# Patient Record
Sex: MALE | Race: WHITE | NOT HISPANIC OR LATINO | Employment: OTHER | URBAN - METROPOLITAN AREA
[De-identification: names, ages, dates, MRNs, and addresses within clinical notes are randomized per-mention and may not be internally consistent; named-entity substitution may affect disease eponyms.]

---

## 2022-08-16 DIAGNOSIS — K92.2 GASTROINTESTINAL HEMORRHAGE, UNSPECIFIED GASTROINTESTINAL HEMORRHAGE TYPE: ICD-10-CM

## 2022-08-16 DIAGNOSIS — D50.0 IRON DEFICIENCY ANEMIA SECONDARY TO BLOOD LOSS (CHRONIC): Primary | ICD-10-CM

## 2022-08-17 DIAGNOSIS — Z86.010 HISTORY OF COLON POLYPS: Primary | ICD-10-CM

## 2022-08-17 DIAGNOSIS — K21.9 GASTROESOPHAGEAL REFLUX DISEASE, UNSPECIFIED WHETHER ESOPHAGITIS PRESENT: ICD-10-CM

## 2022-08-17 DIAGNOSIS — D64.9 ANEMIA, UNSPECIFIED TYPE: ICD-10-CM

## 2022-08-17 NOTE — PROGRESS NOTES
Anemia-Hgb 10.3-[facility] chart shows he is due for colon-will set up EGD/COLON-if normal will need iron studies and if low then proceed with CAPSULE ENDOSCOPY.  Set up EGD/COLON at [facility] if qualifies -DX: anemia, h/o colon polyps

## 2022-08-17 NOTE — TELEPHONE ENCOUNTER
This is a patient of Dr. Collado's.  He came to me stating that the patient was seen at [facility] with a Hgb of 8.  He was discharged but his anemia remained unexplained.  Dr. Collado ran a CBC yesterday and his Hgb was up to 10.3.  He is a patient of yours and it looks like his colonoscopy was due last year.  I will scan in his labs for your review, but I was not sure if you wanted him worked into clinic or if you wanted him directly scheduled.  Let me know.  KDL, CMA

## 2022-08-18 DIAGNOSIS — T39.395A NSAID-ASSOCIATED GASTROPATHY: Primary | ICD-10-CM

## 2022-08-18 DIAGNOSIS — K31.89 NSAID-ASSOCIATED GASTROPATHY: Primary | ICD-10-CM

## 2022-08-19 VITALS — BODY MASS INDEX: 29.62 KG/M2 | WEIGHT: 200 LBS | HEIGHT: 69 IN

## 2022-08-19 RX ORDER — PANTOPRAZOLE SODIUM 40 MG/1
TABLET, DELAYED RELEASE ORAL
COMMUNITY
Start: 2022-08-15

## 2022-08-19 RX ORDER — LISINOPRIL 20 MG/1
TABLET ORAL
COMMUNITY
Start: 2022-08-02

## 2022-08-19 RX ORDER — CYANOCOBALAMIN 1000 UG/ML
INJECTION, SOLUTION INTRAMUSCULAR; SUBCUTANEOUS
COMMUNITY
Start: 2022-08-15 | End: 2023-12-04

## 2022-08-19 RX ORDER — PRAVASTATIN SODIUM 40 MG/1
40 TABLET ORAL NIGHTLY
COMMUNITY
Start: 2022-08-02

## 2022-08-19 RX ORDER — SUCRALFATE 1 G/1
TABLET ORAL
COMMUNITY
Start: 2022-08-15 | End: 2023-11-21

## 2022-08-19 RX ORDER — SOD SULF/POT CHLORIDE/MAG SULF 1.479 G
12 TABLET ORAL DAILY
Qty: 24 TABLET | Refills: 0 | Status: SHIPPED | OUTPATIENT
Start: 2022-08-19 | End: 2023-12-04

## 2022-08-19 NOTE — TELEPHONE ENCOUNTER
Spoke w/ pt to schedule EGD/Colon @ [facility]. Reviewed/Updated Epic chart w/ pt. Scheduled procedure. Reviewed instructions w/ pt who voiced understanding. Will email instructions per pt request. Sarah SMITH

## 2022-08-19 NOTE — TELEPHONE ENCOUNTER
"[place] [facility]  [address] [address]  Phone: [phone]  Fax: [phone]    History & Physical         Provider: Dr. Pool Montes    Patient Name: Jimbo Biswas DOB (age):1960  62 y.o.           Gender: male   Phone: [phone]     Referring Physician: Luis Collado     Vital Signs:   Height - 5' 9"  Weight - 200 lbs  BMI -  29.5    Plan: EGD/ Colonoscopy    Encounter Diagnoses   Name Primary?    History of colon polyps Yes    Gastroesophageal reflux disease, unspecified whether esophagitis present     Anemia, unspecified type            History:      Past Medical History:   Diagnosis Date    Acid reflux     Anemia, unspecified     High blood pressure     High cholesterol     Personal history of colonic polyps       Past Surgical History:   Procedure Laterality Date    COLONOSCOPY W/ BIOPSIES  2018    Polyp (3mm) in mid-ascending colon, Polyp (3mm) in the transverse colon, Polyp (2mm) in the descending colon. Grade/Stage 1 internal hemorrhoids.    HERNIA REPAIR      Inguinal      Medication List with Changes/Refills   New Medications    SOD SULF-POT CHLORIDE-MAG SULF (SUTAB) 1.479-0.188- 0.225 GRAM TABLET    Take 12 tablets by mouth once daily. Take according to package instructions with indicated amount of water. No breakfast day before test. May substitute with Suprep, Clenpiq, Plenvu, Moviprep or GoLytely based on Rx plan and patient preference.      Review of patient's allergies indicates:  No Known Allergies   Family History   Problem Relation Age of Onset    Ulcerative colitis Neg Hx     Crohn's disease Neg Hx     Liver disease Neg Hx     Colon cancer Neg Hx     Throat cancer Neg Hx     Esophageal cancer Neg Hx     Pancreatic cancer Neg Hx     Stomach cancer Neg Hx       Social History     Tobacco Use    Smoking status: Never Smoker    Smokeless " tobacco: Never Used   Substance Use Topics    Alcohol use: Not Currently     Alcohol/week: 2.0 standard drinks     Types: 2 Standard drinks or equivalent per week    Drug use: Never        Physical Examination:     General Appearance:___________________________  HEENT: _____________________________________  Abdomen:____________________________________  Heart:________________________________________  Lungs:_______________________________________  Extremities:___________________________________  Skin:_________________________________________  Endocrine:____________________________________  Genitourinary:_________________________________  Neurological:__________________________________      Patient has been evaluated immediately prior to sedation and is medically cleared for endoscopy with IVCS as an ASA class: ______      Physician Signature: _________________________       Date: ________  Time: ________

## 2022-09-09 ENCOUNTER — TELEPHONE (OUTPATIENT)
Dept: ADMINISTRATIVE | Facility: CLINIC | Age: 62
End: 2022-09-09
Payer: COMMERCIAL

## 2022-09-14 ENCOUNTER — OUTSIDE PLACE OF SERVICE (OUTPATIENT)
Dept: GASTROENTEROLOGY | Facility: CLINIC | Age: 62
End: 2022-09-14

## 2022-09-14 PROCEDURE — 45385 COLONOSCOPY W/LESION REMOVAL: CPT | Mod: 33,,, | Performed by: INTERNAL MEDICINE

## 2022-09-14 PROCEDURE — 45385 PR COLONOSCOPY,REMV LESN,SNARE: ICD-10-PCS | Mod: 33,,, | Performed by: INTERNAL MEDICINE

## 2022-09-14 PROCEDURE — 43239 EGD BIOPSY SINGLE/MULTIPLE: CPT | Mod: 51,,, | Performed by: INTERNAL MEDICINE

## 2022-09-14 PROCEDURE — 43239 PR EGD, FLEX, W/BIOPSY, SGL/MULTI: ICD-10-PCS | Mod: 51,,, | Performed by: INTERNAL MEDICINE

## 2022-09-19 ENCOUNTER — TELEPHONE (OUTPATIENT)
Dept: GASTROENTEROLOGY | Facility: CLINIC | Age: 62
End: 2022-09-19
Payer: COMMERCIAL

## 2022-09-19 NOTE — TELEPHONE ENCOUNTER
See notes from [facility] below:  Bx's of stomach show inflammation-continue PPI, Polyps-one showed High grade dysplasia-due to this finding will need repeat COLON in 3 mo instead of 6 mo.    Bx's of stomach show inflammation-continue PPI, Polyps-one showed High grade dysplasia-due to this finding will need repeat COLON in 3 mo instead of 6 mo.

## 2022-09-20 DIAGNOSIS — Z86.010 PERSONAL HISTORY OF COLONIC POLYPS: Primary | ICD-10-CM

## 2022-09-20 RX ORDER — SOD SULF/POT CHLORIDE/MAG SULF 1.479 G
12 TABLET ORAL DAILY
Qty: 24 TABLET | Refills: 0 | Status: SHIPPED | OUTPATIENT
Start: 2022-09-20 | End: 2023-10-24 | Stop reason: SDUPTHER

## 2022-09-20 NOTE — TELEPHONE ENCOUNTER
Reached out to patient and got him scheduled for his 3 mth Colonoscopy.Patient voiced understood. LUANA

## 2022-11-29 VITALS — BODY MASS INDEX: 29.62 KG/M2 | HEIGHT: 69 IN | WEIGHT: 200 LBS

## 2022-11-29 NOTE — TELEPHONE ENCOUNTER
"[place] [facility]  [address] [address]  Phone: [phone]  Fax: [phone]    History & Physical         Provider: Dr. Pool Montes    Patient Name: Jimbo Biswas DOB (age):1960  62 y.o.           Gender: male   Phone: [phone]     Referring Physician: Luis Collado     Vital Signs:   Height - 5' 9"  Weight - 200 lbs  BMI -  29.5    Plan: Colonoscopy @ [facility]    Encounter Diagnosis   Name Primary?    Personal history of colonic polyps Yes           History:      Past Medical History:   Diagnosis Date    Acid reflux     Anemia, unspecified     High blood pressure     High cholesterol     Personal history of colonic polyps       Past Surgical History:   Procedure Laterality Date    COLONOSCOPY W/ BIOPSIES  2018    Polyp (3mm) in mid-ascending colon, Polyp (3mm) in the transverse colon, Polyp (2mm) in the descending colon. Grade/Stage 1 internal hemorrhoids.    HERNIA REPAIR  1972    Inguinal      Medication List with Changes/Refills   New Medications    SOD SULF-POT CHLORIDE-MAG SULF (SUTAB) 1.479-0.188- 0.225 GRAM TABLET    Take 12 tablets by mouth once daily. Take according to package instructions with indicated amount of water. No breakfast day before test. May substitute with Suprep, Clenpiq, Plenvu, Moviprep or GoLytely based on Rx plan and patient preference.   Current Medications    CYANOCOBALAMIN 1,000 MCG/ML INJECTION        LISINOPRIL (PRINIVIL,ZESTRIL) 20 MG TABLET    GIVE 1 TABLET BY MOUTH ONCE DAILY    PANTOPRAZOLE (PROTONIX) 40 MG TABLET        PRAVASTATIN (PRAVACHOL) 40 MG TABLET    Take 40 mg by mouth every evening.    SOD SULF-POT CHLORIDE-MAG SULF (SUTAB) 1.479-0.188- 0.225 GRAM TABLET    Take 12 tablets by mouth once daily. Take according to package instructions with indicated amount of water. No breakfast day before test. May substitute with Suprep, Clenpiq, Plenvu, " Moviprep or GoLytely based on Rx plan and patient preference.    SUCRALFATE (CARAFATE) 1 GRAM TABLET          Review of patient's allergies indicates:  No Known Allergies   Family History   Problem Relation Age of Onset    Ulcerative colitis Neg Hx     Crohn's disease Neg Hx     Liver disease Neg Hx     Colon cancer Neg Hx     Throat cancer Neg Hx     Esophageal cancer Neg Hx     Pancreatic cancer Neg Hx     Stomach cancer Neg Hx       Social History     Tobacco Use    Smoking status: Never    Smokeless tobacco: Never   Substance Use Topics    Alcohol use: Not Currently     Alcohol/week: 2.0 standard drinks     Types: 2 Standard drinks or equivalent per week    Drug use: Never        Physical Examination:     General Appearance:___________________________  HEENT: _____________________________________  Abdomen:____________________________________  Heart:________________________________________  Lungs:_______________________________________  Extremities:___________________________________  Skin:_________________________________________  Endocrine:____________________________________  Genitourinary:_________________________________  Neurological:__________________________________      Patient has been evaluated immediately prior to sedation and is medically cleared for endoscopy with IVCS as an ASA class: ______      Physician Signature: _________________________       Date: ________  Time: ________

## 2022-12-07 ENCOUNTER — OUTSIDE PLACE OF SERVICE (OUTPATIENT)
Dept: GASTROENTEROLOGY | Facility: CLINIC | Age: 62
End: 2022-12-07

## 2022-12-07 LAB — CRC RECOMMENDATION EXT: NORMAL

## 2022-12-07 PROCEDURE — 45385 PR COLONOSCOPY,REMV LESN,SNARE: ICD-10-PCS | Mod: 33,,, | Performed by: INTERNAL MEDICINE

## 2022-12-07 PROCEDURE — 45385 COLONOSCOPY W/LESION REMOVAL: CPT | Mod: 33,,, | Performed by: INTERNAL MEDICINE

## 2022-12-21 ENCOUNTER — TELEPHONE (OUTPATIENT)
Dept: GASTROENTEROLOGY | Facility: CLINIC | Age: 62
End: 2022-12-21
Payer: COMMERCIAL

## 2022-12-21 NOTE — TELEPHONE ENCOUNTER
Per Dr. Montes: polyp ok-repeat colon in 5 yrs.    Called patient with results patient verbalized understanding.

## 2023-01-26 ENCOUNTER — DOCUMENTATION ONLY (OUTPATIENT)
Dept: GASTROENTEROLOGY | Facility: CLINIC | Age: 63
End: 2023-01-26
Payer: COMMERCIAL

## 2023-10-20 ENCOUNTER — TELEPHONE (OUTPATIENT)
Dept: GASTROENTEROLOGY | Facility: CLINIC | Age: 63
End: 2023-10-20
Payer: COMMERCIAL

## 2023-10-20 DIAGNOSIS — Z12.11 SCREENING FOR COLON CANCER: ICD-10-CM

## 2023-10-20 DIAGNOSIS — Z86.010 HISTORY OF COLON POLYPS: Primary | ICD-10-CM

## 2023-10-24 VITALS — BODY MASS INDEX: 29.62 KG/M2 | HEIGHT: 69 IN | WEIGHT: 200 LBS

## 2023-10-24 NOTE — TELEPHONE ENCOUNTER
Patient's last colonoscopy was w/ [facility] on 12/7/2022. Grade/Stage I internal hemorrhoids. Polyp (2 mm) in the descending colon. (Polypectomy). Per [facility]'s report.    Patient is not having any current problems or issues. He denied having any hx of seizures, sleep apnea, or kidney disease.     Sutab is already in patient's medication list.  Chart was reviewed and updated with patient. Prep instructions were also reviewed and sent to patient's email at [email or web address].    Colonoscopy scheduled w/ [facility] at [facility] on 4/1/2024. Patient is already aware of [facility] penitentiary 12/2023. - dmp

## 2023-11-17 ENCOUNTER — TELEPHONE (OUTPATIENT)
Dept: GASTROENTEROLOGY | Facility: CLINIC | Age: 63
End: 2023-11-17
Payer: COMMERCIAL

## 2023-11-17 DIAGNOSIS — Z86.010 HISTORY OF COLON POLYPS: ICD-10-CM

## 2023-11-17 DIAGNOSIS — D12.6 HIGH GRADE DYSPLASIA IN COLONIC ADENOMA: Primary | ICD-10-CM

## 2023-11-17 DIAGNOSIS — Z12.11 SCREENING FOR COLON CANCER: ICD-10-CM

## 2023-11-17 RX ORDER — SOD SULF/POT CHLORIDE/MAG SULF 1.479 G
12 TABLET ORAL DAILY
Qty: 24 TABLET | Refills: 0 | Status: CANCELLED | OUTPATIENT
Start: 2023-11-17

## 2023-11-27 NOTE — TELEPHONE ENCOUNTER
Note states scheduled for EGD/Colonoscopy but order is only for colonoscopy and [facility] schedule is only for colonoscopy. He had a polyp with high grade dysplasia in the transverse colon and in the cecum. Has he had any blood work since 8/2022? Is Dr. Collado still his primary care provider? He had a healing gastric ulcer on his last EGD and may need repeat EGD to ensure it has healed. May be best for the patient to come in for OV so this can be reviewed with patient and we can get updated blood work as well. He may come in for OV with me any day this week. If patient does not want to come in for OV and/or only wants a colonoscopy, then the order will need to be updated so Dr. Khan is aware of previous polyps with HGD.  MLC

## 2023-11-29 NOTE — TELEPHONE ENCOUNTER
My note was supposed to say *colonoscopy only    My apologies. I was only recreating orders.   Patient's PCP is Linus Santana MD. Said he's never seen Luis Collado and not sure why that was in his chart.    Yes to bloodwork. Had it done in June 2023 with Max.     Patient was relayed the info you said about EGD and voiced understanding. Agreed to the office visit.  OV scheduled Monday 12/4/23 w/ [facility]. I know you said this week but your schedule is booked unless you were wanting him to just be an addon. Please let me know and I'll call the patient back. - dmp

## 2023-12-03 NOTE — PROGRESS NOTES
Clinic Note    Reason for visit:  The primary encounter diagnosis was High grade dysplasia in colonic adenoma. Diagnoses of Anemia, unspecified type, Gastric ulcer, unspecified chronicity, unspecified whether gastric ulcer hemorrhage or perforation present, and History of colon polyps were also pertinent to this visit.    PCP: Sandeep Santana       HPI:  This is a 63 y.o. male who is established with Dr. Montes. Patient had colon polyp with HGD in transverse and cecum and is due for repeat colonoscopy. He is scheduled for a repeat colonoscopy with Dr. Khan (due to Dr. Montes's correction) on 2/23/2024. He also had healing gastric ulcer on his last EGD and h/o anemia. He states in 7/2022, he was in hospital due to anemia which is why he had EGD in 9/2022. He was taking excessive NSAIDs at that time. No NSAID use since that tome. No blood in stool. He is taking pantoprazole 40 mg daily. Does not have any indigestion/reflux. He is having blood work tomorrow for Dr. Santana. He stopped B12 a while back.    Colonoscopy 12/7/2022: Small IH, internal anal skin tags. 1 TA. Repeat colon in 1 year.     EGD/Colonoscopy 9/14/2022: Small HH, healing ulcer in antrum. Bx's of stomach show inflammation-continue PPI, 4 TAs, 2 TAs w/ HGD (cecum and mid transverse). Repeat colon in 3 months    Labs 8/2022: Hgb 10.3L, MCV 87, CBC onl    Review of Systems   Constitutional:  Negative for diaphoresis, fatigue, fever and unexpected weight change.   HENT:  Negative for hearing loss, mouth sores, postnasal drip, sore throat and trouble swallowing.    Eyes:  Negative for pain, discharge and eye dryness.   Respiratory:  Negative for apnea, cough, choking, chest tightness, shortness of breath and wheezing.    Cardiovascular:  Negative for chest pain, palpitations and leg swelling.   Gastrointestinal:  Negative for abdominal distention, abdominal pain, anal bleeding, blood in stool, change in bowel habit, constipation, diarrhea, nausea, rectal  pain, vomiting, reflux and fecal incontinence.   Genitourinary:  Negative for bladder incontinence, dysuria and hematuria.   Musculoskeletal:  Negative for arthralgias, back pain and joint swelling.   Integumentary:  Negative for color change and rash.   Allergic/Immunologic: Negative for environmental allergies and food allergies.   Neurological:  Negative for seizures and headaches.   Hematological:  Negative for adenopathy. Does not bruise/bleed easily.        Past Medical History:   Diagnosis Date    Acid reflux     Anemia, unspecified     BMI 29.53 10/24/2023    High blood pressure     High cholesterol     Personal history of colonic polyps      Past Surgical History:   Procedure Laterality Date    COLONOSCOPY  12/07/2022    COLONOSCOPY W/ BIOPSIES  04/12/2018    Polyp (3mm) in mid-ascending colon, Polyp (3mm) in the transverse colon, Polyp (2mm) in the descending colon. Grade/Stage 1 internal hemorrhoids.    HERNIA REPAIR  1972    Inguinal     Family History   Problem Relation Age of Onset    Ulcerative colitis Neg Hx     Crohn's disease Neg Hx     Liver disease Neg Hx     Colon cancer Neg Hx     Throat cancer Neg Hx     Esophageal cancer Neg Hx     Pancreatic cancer Neg Hx     Stomach cancer Neg Hx      Social History     Tobacco Use    Smoking status: Never    Smokeless tobacco: Never   Substance Use Topics    Alcohol use: Not Currently     Alcohol/week: 2.0 standard drinks of alcohol     Types: 2 Standard drinks or equivalent per week    Drug use: Never     Review of patient's allergies indicates:  No Known Allergies     Medication List with Changes/Refills   New Medications    SOD SULF-POT CHLORIDE-MAG SULF (SUTAB) 1.479-0.188- 0.225 GRAM TABLET    Take according to package instructions with indicated amount of water. No breakfast day before test. May substitute with Suprep, Clenpiq, Plenvu, Moviprep or GoLytely based on Rx plan and patient preference.   Current Medications    LISINOPRIL (PRINIVIL,ZESTRIL)  "20 MG TABLET    GIVE 1 TABLET BY MOUTH ONCE DAILY    PANTOPRAZOLE (PROTONIX) 40 MG TABLET        PRAVASTATIN (PRAVACHOL) 40 MG TABLET    Take 40 mg by mouth every evening.   Discontinued Medications    CYANOCOBALAMIN 1,000 MCG/ML INJECTION        SOD SULF-POT CHLORIDE-MAG SULF (SUTAB) 1.479-0.188- 0.225 GRAM TABLET    Take 12 tablets by mouth once daily. Take according to package instructions with indicated amount of water. No breakfast day before test. May substitute with Suprep, Clenpiq, Plenvu, Moviprep or GoLytely based on Rx plan and patient preference.         Vital Signs:  BP (!) 154/93 (BP Location: Left arm, Patient Position: Sitting)   Pulse 98   Ht 5' 9" (1.753 m)   Wt 94.9 kg (209 lb 3.2 oz)   SpO2 99%   BMI 30.89 kg/m²         Physical Exam  Constitutional:       General: He is not in acute distress.     Appearance: Normal appearance. He is well-developed. He is not ill-appearing or toxic-appearing.   HENT:      Head: Normocephalic and atraumatic.      Nose: Nose normal.      Mouth/Throat:      Mouth: Mucous membranes are moist.      Pharynx: Oropharynx is clear. No oropharyngeal exudate or posterior oropharyngeal erythema.   Eyes:      General: Lids are normal. No scleral icterus.        Right eye: No discharge.         Left eye: No discharge.      Conjunctiva/sclera: Conjunctivae normal.   Cardiovascular:      Rate and Rhythm: Normal rate and regular rhythm.      Pulses:           Radial pulses are 2+ on the right side and 2+ on the left side.   Pulmonary:      Effort: Pulmonary effort is normal. No respiratory distress.      Breath sounds: No stridor. No wheezing.   Abdominal:      General: Bowel sounds are normal. There is no distension.      Palpations: Abdomen is soft. There is no fluid wave, hepatomegaly, splenomegaly or mass.      Tenderness: There is no abdominal tenderness. There is no guarding or rebound.   Musculoskeletal:      Cervical back: Full passive range of motion without pain. "   Lymphadenopathy:      Cervical: No cervical adenopathy.   Skin:     General: Skin is warm and dry.      Capillary Refill: Capillary refill takes less than 2 seconds.      Coloration: Skin is not cyanotic, jaundiced or pale.   Neurological:      General: No focal deficit present.      Mental Status: He is alert and oriented to person, place, and time.   Psychiatric:         Mood and Affect: Mood normal.         Behavior: Behavior is cooperative.            All of the data above and below has been reviewed by myself and any further interpretations will be reflected in the assessment and plan.   The data includes review of external notes, and independent interpretation of lab results, procedures, x-rays, and imaging reports.      Assessment:  High grade dysplasia in colonic adenoma  Comments:  cecum and mid transverse 9/2022  Orders:  -     Ambulatory Referral to External Surgery    Anemia, unspecified type    Gastric ulcer, unspecified chronicity, unspecified whether gastric ulcer hemorrhage or perforation present    History of colon polyps  -     Ambulatory Referral to External Surgery  -     sod sulf-pot chloride-mag sulf (SUTAB) 1.479-0.188- 0.225 gram tablet; Take according to package instructions with indicated amount of water. No breakfast day before test. May substitute with Suprep, Clenpiq, Plenvu, Moviprep or GoLytely based on Rx plan and patient preference.  Dispense: 24 tablet; Refill: 0    Will request copy of labs being done tomorrow with PCP.   Healing ulcer on EGD 9/2022. Will make sure anemia resolved. May taper off panto.  Colon due, h/o HGD on polyps.     Recommendations:  Give instructions for colonoscopy with Dr. Khan on 2/23/2024.     Risks, benefits, and alternatives of medical management, any associated procedures, and/or treatment discussed with the patient. Patient given opportunity to ask questions and voices understanding. Patient has elected to proceed with the recommended care  modalities as discussed.    Instructed patient to notify my office if they have not been contacted within two weeks after any procedures, submitting any samples (biopsies, blood, stool, urine, etc.) or after any imaging (X-ray, CT, MRI, etc.).     Follow up if symptoms worsen or fail to improve.    Order summary:  Orders Placed This Encounter   Procedures    Ambulatory Referral to External Surgery          This document may have been created using a voice recognition transcribing system. Incorrect words or phrases may have been missed during proofreading. Please interpret accordingly or contact me for clarification.     Michelle Sibley MD

## 2023-12-04 ENCOUNTER — OFFICE VISIT (OUTPATIENT)
Dept: GASTROENTEROLOGY | Facility: CLINIC | Age: 63
End: 2023-12-04
Payer: COMMERCIAL

## 2023-12-04 ENCOUNTER — TELEPHONE (OUTPATIENT)
Dept: GASTROENTEROLOGY | Facility: CLINIC | Age: 63
End: 2023-12-04

## 2023-12-04 VITALS
HEART RATE: 98 BPM | BODY MASS INDEX: 30.98 KG/M2 | SYSTOLIC BLOOD PRESSURE: 154 MMHG | WEIGHT: 209.19 LBS | DIASTOLIC BLOOD PRESSURE: 93 MMHG | HEIGHT: 69 IN | OXYGEN SATURATION: 99 %

## 2023-12-04 DIAGNOSIS — D64.9 ANEMIA, UNSPECIFIED TYPE: ICD-10-CM

## 2023-12-04 DIAGNOSIS — K25.9 GASTRIC ULCER, UNSPECIFIED CHRONICITY, UNSPECIFIED WHETHER GASTRIC ULCER HEMORRHAGE OR PERFORATION PRESENT: ICD-10-CM

## 2023-12-04 DIAGNOSIS — D12.6 HIGH GRADE DYSPLASIA IN COLONIC ADENOMA: Primary | ICD-10-CM

## 2023-12-04 DIAGNOSIS — Z86.010 HISTORY OF COLON POLYPS: ICD-10-CM

## 2023-12-04 PROCEDURE — 4010F PR ACE/ARB THEARPY RXD/TAKEN: ICD-10-PCS | Mod: CPTII,S$GLB,,

## 2023-12-04 PROCEDURE — 3008F BODY MASS INDEX DOCD: CPT | Mod: CPTII,S$GLB,,

## 2023-12-04 PROCEDURE — 3008F PR BODY MASS INDEX (BMI) DOCUMENTED: ICD-10-PCS | Mod: CPTII,S$GLB,,

## 2023-12-04 PROCEDURE — 1101F PR PT FALLS ASSESS DOC 0-1 FALLS W/OUT INJ PAST YR: ICD-10-PCS | Mod: CPTII,S$GLB,,

## 2023-12-04 PROCEDURE — 99214 PR OFFICE/OUTPT VISIT, EST, LEVL IV, 30-39 MIN: ICD-10-PCS | Mod: S$GLB,,,

## 2023-12-04 PROCEDURE — 99214 OFFICE O/P EST MOD 30 MIN: CPT | Mod: S$GLB,,,

## 2023-12-04 PROCEDURE — 1101F PT FALLS ASSESS-DOCD LE1/YR: CPT | Mod: CPTII,S$GLB,,

## 2023-12-04 PROCEDURE — 3080F PR MOST RECENT DIASTOLIC BLOOD PRESSURE >= 90 MM HG: ICD-10-PCS | Mod: CPTII,S$GLB,,

## 2023-12-04 PROCEDURE — 3288F FALL RISK ASSESSMENT DOCD: CPT | Mod: CPTII,S$GLB,,

## 2023-12-04 PROCEDURE — 3080F DIAST BP >= 90 MM HG: CPT | Mod: CPTII,S$GLB,,

## 2023-12-04 PROCEDURE — 1159F MED LIST DOCD IN RCRD: CPT | Mod: CPTII,S$GLB,,

## 2023-12-04 PROCEDURE — 1160F PR REVIEW ALL MEDS BY PRESCRIBER/CLIN PHARMACIST DOCUMENTED: ICD-10-PCS | Mod: CPTII,S$GLB,,

## 2023-12-04 PROCEDURE — 3288F PR FALLS RISK ASSESSMENT DOCUMENTED: ICD-10-PCS | Mod: CPTII,S$GLB,,

## 2023-12-04 PROCEDURE — 4010F ACE/ARB THERAPY RXD/TAKEN: CPT | Mod: CPTII,S$GLB,,

## 2023-12-04 PROCEDURE — 1159F PR MEDICATION LIST DOCUMENTED IN MEDICAL RECORD: ICD-10-PCS | Mod: CPTII,S$GLB,,

## 2023-12-04 PROCEDURE — 3077F SYST BP >= 140 MM HG: CPT | Mod: CPTII,S$GLB,,

## 2023-12-04 PROCEDURE — 1160F RVW MEDS BY RX/DR IN RCRD: CPT | Mod: CPTII,S$GLB,,

## 2023-12-04 PROCEDURE — 3077F PR MOST RECENT SYSTOLIC BLOOD PRESSURE >= 140 MM HG: ICD-10-PCS | Mod: CPTII,S$GLB,,

## 2023-12-04 RX ORDER — SOD SULF/POT CHLORIDE/MAG SULF 1.479 G
TABLET ORAL
Qty: 24 TABLET | Refills: 0 | Status: SHIPPED | OUTPATIENT
Start: 2023-12-04

## 2023-12-04 NOTE — TELEPHONE ENCOUNTER
Patient is having blood work completed tomorrow, 12/5/2023. Request copy of lab results on 12/8/2023 from Dr. Santana's office.   MLC

## 2023-12-04 NOTE — LETTER
December 4, 2023        Sandeep Santana MD  [address] [place] [address] [address]  [place] [address]             [place] [facility]  [address] [address]  Phone: [phone]  Fax: [phone]   Patient: Jimbo Biswas   MR Number: [id number]   YOB: 1960   Date of Visit: 12/4/2023       Dear Dr. Santana:    Thank you for referring Jimbo [place] to me for evaluation. Attached you will find relevant portions of my assessment and plan of care.    If you have questions, please do not hesitate to call me. I look forward to following Jimbo [place] along with you.    Sincerely,      Michelle Sibley, NP            CC  No Recipients    Enclosure

## 2023-12-04 NOTE — PATIENT INSTRUCTIONS
Keep colonoscopy with Dr. Khan on 2/23/2024.     Please notify my office if you have not been contacted within two weeks after any procedures, submitting any samples (biopsies, blood, stool, urine, etc.) or after any imaging (X-ray, CT, MRI, etc.).

## 2024-01-05 ENCOUNTER — TELEPHONE (OUTPATIENT)
Dept: GASTROENTEROLOGY | Facility: CLINIC | Age: 64
End: 2024-01-05
Payer: COMMERCIAL

## 2024-01-05 DIAGNOSIS — D12.6 HIGH GRADE DYSPLASIA IN COLONIC ADENOMA: ICD-10-CM

## 2024-01-05 DIAGNOSIS — D64.9 ANEMIA, UNSPECIFIED TYPE: Primary | ICD-10-CM

## 2024-01-05 DIAGNOSIS — K25.9 GASTRIC ULCER, UNSPECIFIED CHRONICITY, UNSPECIFIED WHETHER GASTRIC ULCER HEMORRHAGE OR PERFORATION PRESENT: ICD-10-CM

## 2024-01-05 NOTE — TELEPHONE ENCOUNTER
Notify patient I reviewed the lab results he had on 12/5/2023 with Dr. Santana. He did not check his blood count or iron levels. I would recommend he have repeat blood work completed to ensure his anemia has resolved. Orders signed.     12/5/2023: A1C/Lipid wnl. No CBC results. Will order to ensure anemia resolved.   MLC

## 2024-02-12 ENCOUNTER — TELEPHONE (OUTPATIENT)
Dept: GASTROENTEROLOGY | Facility: CLINIC | Age: 64
End: 2024-02-12
Payer: COMMERCIAL

## 2024-02-12 NOTE — TELEPHONE ENCOUNTER
----- Message from Wan Adame MA sent at 2/9/2024  4:09 PM CST -----  Regarding: lab orders  Contact: self    ----- Message -----  From: Kary Martines  Sent: 2/9/2024   3:16 PM CST  To: Bill VALADEZ Staff    Patient calling to provide fax number for labs where his orders are supposed to be sent for his procedure. The fax number is [phone] ([facility]).

## 2024-02-14 ENCOUNTER — TELEPHONE (OUTPATIENT)
Dept: GASTROENTEROLOGY | Facility: CLINIC | Age: 64
End: 2024-02-14
Payer: COMMERCIAL

## 2024-02-14 VITALS — BODY MASS INDEX: 30.96 KG/M2 | WEIGHT: 209 LBS | HEIGHT: 69 IN

## 2024-02-14 DIAGNOSIS — Z86.010 HISTORY OF COLON POLYPS: ICD-10-CM

## 2024-02-14 DIAGNOSIS — D12.6 HIGH GRADE DYSPLASIA IN COLONIC ADENOMA: Primary | ICD-10-CM

## 2024-02-14 NOTE — TELEPHONE ENCOUNTER
S/w pt and told her that I was calling as a courtesy regarding his upcoming Colon with [facility] on 2/23/24, Friday and wanted to verify that he had his paper prep instructions and meds. Pt stated he has the instructions but still needs to pickup the meds. I also mentioned that [facility] will call on Thurs with the arrival time, GI lab is located on the third floor, and to pre-register before next Friday.      Pt ask stated he was supposed to have lab fax over. I told him that I see where [facility] faxed them on  2/12/24. Pt stated he will call the path lab on [address]

## 2024-02-14 NOTE — TELEPHONE ENCOUNTER
"[place] [facility]  [address] [address]  Phone: [phone]  Fax: [phone]    History & Physical         Provider: Dr. Chinyere Khan    Patient Name: Jimbo Biswas DOB (age):1960  63 y.o.           Gender: male   Phone: [phone]     Referring Physician: Sandeep Santana     Vital Signs:   Height - 5' 9"  Weight - 209 lb  BMI -  30.86    Plan: Colonoscopy @ [facility]    Encounter Diagnoses   Name Primary?    High grade dysplasia in colonic adenoma Yes    History of colon polyps            History:      Past Medical History:   Diagnosis Date    Acid reflux     Anemia, unspecified     BMI 29.53 10/24/2023    High blood pressure     High cholesterol     Personal history of colonic polyps       Past Surgical History:   Procedure Laterality Date    COLONOSCOPY  2022    COLONOSCOPY W/ BIOPSIES  2018    Polyp (3mm) in mid-ascending colon, Polyp (3mm) in the transverse colon, Polyp (2mm) in the descending colon. Grade/Stage 1 internal hemorrhoids.    HERNIA REPAIR      Inguinal      Medication List with Changes/Refills   Current Medications    LISINOPRIL (PRINIVIL,ZESTRIL) 20 MG TABLET    GIVE 1 TABLET BY MOUTH ONCE DAILY    PANTOPRAZOLE (PROTONIX) 40 MG TABLET        PRAVASTATIN (PRAVACHOL) 40 MG TABLET    Take 40 mg by mouth every evening.    SOD SULF-POT CHLORIDE-MAG SULF (SUTAB) 1.479-0.188- 0.225 GRAM TABLET    Take according to package instructions with indicated amount of water. No breakfast day before test. May substitute with Suprep, Clenpiq, Plenvu, Moviprep or GoLytely based on Rx plan and patient preference.      Review of patient's allergies indicates:  No Known Allergies   Family History   Problem Relation Age of Onset    Ulcerative colitis Neg Hx     Crohn's disease Neg Hx     Liver disease Neg Hx     Colon cancer Neg Hx     Throat cancer Neg Hx     Esophageal cancer Neg " Hx     Pancreatic cancer Neg Hx     Stomach cancer Neg Hx       Social History     Tobacco Use    Smoking status: Never    Smokeless tobacco: Never   Substance Use Topics    Alcohol use: Not Currently     Alcohol/week: 2.0 standard drinks of alcohol     Types: 2 Standard drinks or equivalent per week    Drug use: Never        Physical Examination:     General Appearance:___________________________  HEENT: _____________________________________  Abdomen:____________________________________  Heart:________________________________________  Lungs:_______________________________________  Extremities:___________________________________  Skin:_________________________________________  Endocrine:____________________________________  Genitourinary:_________________________________  Neurological:__________________________________      Patient has been evaluated immediately prior to sedation and is medically cleared for endoscopy with IVCS as an ASA class: ______      Physician Signature: _________________________       Date: ________  Time: ________

## 2024-02-23 ENCOUNTER — OUTSIDE PLACE OF SERVICE (OUTPATIENT)
Dept: GASTROENTEROLOGY | Facility: CLINIC | Age: 64
End: 2024-02-23

## 2024-02-23 LAB — CRC RECOMMENDATION EXT: NORMAL

## 2024-02-23 PROCEDURE — 45380 COLONOSCOPY AND BIOPSY: CPT | Mod: 33,59,, | Performed by: INTERNAL MEDICINE

## 2024-02-23 PROCEDURE — 45385 COLONOSCOPY W/LESION REMOVAL: CPT | Mod: 33,,, | Performed by: INTERNAL MEDICINE

## 2024-02-29 ENCOUNTER — TELEPHONE (OUTPATIENT)
Dept: GASTROENTEROLOGY | Facility: CLINIC | Age: 64
End: 2024-02-29
Payer: COMMERCIAL

## 2024-03-01 NOTE — TELEPHONE ENCOUNTER
15 TA, repeat colonoscopy in one year. Rec genetic testing.    Notify patient. His colon polyps were benign. Repeat colonoscopy in one year given the number of colon polyps he had. Rec genetic testing. Send order to Amy Gutierrez.  Make colonoscopy date in one year and [facility] OV 2-3 weeks before that date.  NBP

## 2024-03-01 NOTE — TELEPHONE ENCOUNTER
Notified pt of colonoscopy results and scheduled 1 yr repeat for 3/6/24 -Thurs. Pt would like a Friday if possible, I told him that we do not have NBP on-call schedule at this time, but during his OV with [facility] for 2/16/24 it can be changed. I asked if he would be interested in genetic testing and he agreed. I will fill out form and faxed it to Amy Gutierrez. sky

## 2024-04-15 ENCOUNTER — DOCUMENTATION ONLY (OUTPATIENT)
Dept: GASTROENTEROLOGY | Facility: CLINIC | Age: 64
End: 2024-04-15
Payer: COMMERCIAL

## 2024-07-31 ENCOUNTER — TELEPHONE (OUTPATIENT)
Dept: GASTROENTEROLOGY | Facility: CLINIC | Age: 64
End: 2024-07-31
Payer: COMMERCIAL

## 2024-08-01 NOTE — TELEPHONE ENCOUNTER
----- Message from Amy Gutierrez sent at 7/31/2024  8:58 AM CDT -----  Regarding: RE: genetic testing  No, ma'am    ----- Message -----  From: Chinyere Khan MD  Sent: 7/30/2024  11:04 PM CDT  To: Amy Gutierrez; Chinyere Khan MD  Subject: genetic testing                                  Did you get the genetics referral form for this patient?  NBP

## 2025-02-19 ENCOUNTER — OFFICE VISIT (OUTPATIENT)
Dept: GASTROENTEROLOGY | Facility: CLINIC | Age: 65
End: 2025-02-19
Payer: COMMERCIAL

## 2025-02-19 VITALS
HEART RATE: 82 BPM | DIASTOLIC BLOOD PRESSURE: 92 MMHG | HEIGHT: 69 IN | WEIGHT: 216.81 LBS | OXYGEN SATURATION: 95 % | SYSTOLIC BLOOD PRESSURE: 157 MMHG | BODY MASS INDEX: 32.11 KG/M2

## 2025-02-19 DIAGNOSIS — Z86.0100 HISTORY OF COLON POLYPS: ICD-10-CM

## 2025-02-19 DIAGNOSIS — K25.9 GASTRIC ULCER, UNSPECIFIED CHRONICITY, UNSPECIFIED WHETHER GASTRIC ULCER HEMORRHAGE OR PERFORATION PRESENT: ICD-10-CM

## 2025-02-19 DIAGNOSIS — D12.6 HIGH GRADE DYSPLASIA IN COLONIC ADENOMA: ICD-10-CM

## 2025-02-19 DIAGNOSIS — Z87.11 HISTORY OF PEPTIC ULCER DISEASE: ICD-10-CM

## 2025-02-19 DIAGNOSIS — K21.9 GASTROESOPHAGEAL REFLUX DISEASE, UNSPECIFIED WHETHER ESOPHAGITIS PRESENT: Primary | ICD-10-CM

## 2025-02-19 DIAGNOSIS — D64.9 ANEMIA, UNSPECIFIED TYPE: ICD-10-CM

## 2025-02-19 RX ORDER — SOD SULF/POT CHLORIDE/MAG SULF 1.479 G
TABLET ORAL
Qty: 24 TABLET | Refills: 0 | Status: SHIPPED | OUTPATIENT
Start: 2025-02-19

## 2025-02-19 RX ORDER — PREDNISONE 10 MG/1
TABLET ORAL
COMMUNITY
Start: 2025-02-15

## 2025-02-19 RX ORDER — CETIRIZINE HYDROCHLORIDE 5 MG/1
1 TABLET ORAL EVERY MORNING
COMMUNITY

## 2025-02-19 RX ORDER — FERROUS GLUCONATE 324(38)MG
324 TABLET ORAL
COMMUNITY

## 2025-02-19 NOTE — LETTER
February 19, 2025        Sandeep Santana MD  [address] [place] [address]  [place] [address]             [place] [facility]  [address] [address]  Phone: [phone]  Fax: [phone]   Patient: Jimbo Biswas   MR Number: [id number]   YOB: 1960   Date of Visit: 2/19/2025       Dear Dr. Santana:    Thank you for referring Jimbo Biswas to me for evaluation. Attached you will find relevant portions of my assessment and plan of care.    If you have questions, please do not hesitate to call me. I look forward to following Jimbo [place] along with you.    Sincerely,      Michelle Sibley, NP            CC  No Recipients    Enclosure

## 2025-02-19 NOTE — PROGRESS NOTES
Clinic Note    Reason for visit:  The primary encounter diagnosis was Gastroesophageal reflux disease, unspecified whether esophagitis present. Diagnoses of History of peptic ulcer disease, High grade dysplasia in colonic adenoma, Anemia, unspecified type, History of colon polyps, and Gastric ulcer, unspecified chronicity, unspecified whether gastric ulcer hemorrhage or perforation present were also pertinent to this visit.    PCP: Sandeep Santana       HPI:  This is a 65 y.o. male who is established. Patient had colon polyp with HGD in transverse and cecum in 2022. He had 15 colon polyps removed on last colonoscopy. He is scheduled for next colonoscopy with Dr. Khan on 3/6/2025. Genetic testing was negative. He has h/o PUD. No NSAID use. He takes pantoprazole 40 mg daily. He states in the last couple months depending on what he is eating, he has started to have indigestion and epigastric discomfort. No blood in stool. No NSAID use. On PO iron. On prednisone currently for URI.    9/2024 Colaris AP nl.     Colonoscopy 2/23/2024: 15 TA, repeat colonoscopy in one year. Rec genetic testing.     Colonoscopy 12/7/2022: Small IH, internal anal skin tags. 1 TA. Repeat colon in 1 year.      EGD/Colonoscopy 9/14/2022: Small HH, healing ulcer in antrum. Bx's of stomach show inflammation-continue PPI, 4 TAs, 2 TAs w/ HGD (cecum and mid transverse). Repeat colon in 3 months     Labs 8/2022: Hgb 10.3L, MCV 87, CBC onl    Review of Systems   Constitutional:  Negative for diaphoresis, fatigue, fever and unexpected weight change.   HENT:  Negative for hearing loss, mouth sores, postnasal drip, sore throat and trouble swallowing.    Eyes:  Negative for pain, discharge and eye dryness.   Respiratory:  Negative for apnea, cough, choking, chest tightness, shortness of breath and wheezing.    Cardiovascular:  Negative for chest pain, palpitations and leg swelling.   Gastrointestinal:  Negative for abdominal distention, abdominal pain,  anal bleeding, blood in stool, change in bowel habit, constipation, diarrhea, nausea, rectal pain, vomiting, reflux and fecal incontinence.   Genitourinary:  Negative for bladder incontinence, dysuria and hematuria.   Musculoskeletal:  Negative for arthralgias, back pain and joint swelling.   Integumentary:  Negative for color change and rash.   Allergic/Immunologic: Negative for environmental allergies and food allergies.   Neurological:  Negative for seizures and headaches.   Hematological:  Negative for adenopathy. Does not bruise/bleed easily.        Past Medical History:   Diagnosis Date    Acid reflux     Anemia, unspecified     BMI 32.0-32.9,adult     High blood pressure     High cholesterol     Personal history of colonic polyps     2024 Colaris AP nl     Past Surgical History:   Procedure Laterality Date    COLONOSCOPY  12/07/2022    COLONOSCOPY W/ BIOPSIES  04/12/2018    Polyp (3mm) in mid-ascending colon, Polyp (3mm) in the transverse colon, Polyp (2mm) in the descending colon. Grade/Stage 1 internal hemorrhoids.    HERNIA REPAIR  1972    Inguinal     Family History   Problem Relation Name Age of Onset    Ulcerative colitis Neg Hx      Crohn's disease Neg Hx      Liver disease Neg Hx      Colon cancer Neg Hx      Throat cancer Neg Hx      Esophageal cancer Neg Hx      Pancreatic cancer Neg Hx      Stomach cancer Neg Hx       Social History[1]  Review of patient's allergies indicates:  No Known Allergies   Medication List with Changes/Refills   Current Medications    CETIRIZINE (ZYRTEC) 5 MG TABLET    Take 1 tablet by mouth every morning.    FERROUS GLUCONATE (FERGON) 324 MG TABLET    Take 324 mg by mouth.    LISINOPRIL (PRINIVIL,ZESTRIL) 20 MG TABLET    GIVE 1 TABLET BY MOUTH ONCE DAILY    PANTOPRAZOLE (PROTONIX) 40 MG TABLET        PRAVASTATIN (PRAVACHOL) 40 MG TABLET    Take 40 mg by mouth every evening.    PREDNISONE (DELTASONE) 10 MG TABLET    TAKE 3 TABLETS BY MOUTH DAILY FOR 5 DAYS   Changed and/or  "Refilled Medications    Modified Medication Previous Medication    SOD SULF-POT CHLORIDE-MAG SULF (SUTAB) 1.479-0.188- 0.225 GRAM TABLET sod sulf-pot chloride-mag sulf (SUTAB) 1.479-0.188- 0.225 gram tablet       Take according to package instructions with indicated amount of water. No breakfast day before test. May substitute with Suprep, Clenpiq, Plenvu, Moviprep or GoLytely based on Rx plan and patient preference.    Take according to package instructions with indicated amount of water. No breakfast day before test. May substitute with Suprep, Clenpiq, Plenvu, Moviprep or GoLytely based on Rx plan and patient preference.         Vital Signs:  BP (!) 157/92   Pulse 82   Ht 5' 9" (1.753 m)   Wt 98.3 kg (216 lb 12.8 oz)   SpO2 95%   BMI 32.02 kg/m²        Physical Exam  Constitutional:       General: He is not in acute distress.     Appearance: Normal appearance. He is well-developed. He is not ill-appearing or toxic-appearing.   HENT:      Head: Normocephalic and atraumatic.      Nose: Nose normal.      Mouth/Throat:      Mouth: Mucous membranes are moist.      Pharynx: Oropharynx is clear. No oropharyngeal exudate or posterior oropharyngeal erythema.   Eyes:      General: Lids are normal. No scleral icterus.        Right eye: No discharge.         Left eye: No discharge.      Conjunctiva/sclera: Conjunctivae normal.   Cardiovascular:      Rate and Rhythm: Normal rate and regular rhythm.      Pulses:           Radial pulses are 2+ on the right side and 2+ on the left side.   Pulmonary:      Effort: Pulmonary effort is normal. No respiratory distress.      Breath sounds: No stridor. No wheezing.   Abdominal:      General: Bowel sounds are normal. There is no distension.      Palpations: Abdomen is soft. There is no fluid wave, hepatomegaly, splenomegaly or mass.      Tenderness: There is no abdominal tenderness. There is no guarding or rebound.   Lymphadenopathy:      Cervical: No cervical adenopathy.   Skin:   "   General: Skin is warm and dry.      Capillary Refill: Capillary refill takes less than 2 seconds.      Coloration: Skin is not cyanotic, jaundiced or pale.   Neurological:      General: No focal deficit present.      Mental Status: He is alert and oriented to person, place, and time.   Psychiatric:         Mood and Affect: Mood normal.         Behavior: Behavior is cooperative.            All of the data above and below has been reviewed by myself and any further interpretations will be reflected in the assessment and plan.   The data includes review of external notes, and independent interpretation of lab results, procedures, x-rays, and imaging reports.      Assessment:  Gastroesophageal reflux disease, unspecified whether esophagitis present  -     Ambulatory Referral to External Surgery    History of peptic ulcer disease    High grade dysplasia in colonic adenoma    Anemia, unspecified type    History of colon polyps  -     Ambulatory Referral to External Surgery  -     sod sulf-pot chloride-mag sulf (SUTAB) 1.479-0.188- 0.225 gram tablet; Take according to package instructions with indicated amount of water. No breakfast day before test. May substitute with Suprep, Clenpiq, Plenvu, Moviprep or GoLytely based on Rx plan and patient preference.  Dispense: 24 tablet; Refill: 0    Gastric ulcer, unspecified chronicity, unspecified whether gastric ulcer hemorrhage or perforation present  -     Ambulatory Referral to External Surgery    Healing ulcer on EGD 9/2022. On panto 40 daily. Avoids NSAIDs. Having GERD that is new for him as well as epigastric pain. Will plan for EGD w/colonoscopy.  Colon due. Genetic testing neg.     Recommendations:    Proceed with upper and lower endoscopies with Dr. Khan on 3/6/2025.       If any tests, procedures, or imaging has been ordered and you are not contacted to schedule within 1-2 weeks, then you may call the central scheduling department directly at [phone].      Risks, benefits, and alternatives of medical management, any associated procedures, and/or treatment discussed with the patient. Patient given opportunity to ask questions and voices understanding. Patient has elected to proceed with the recommended care modalities as discussed.    Follow up if symptoms worsen or fail to improve.    Order summary:  Orders Placed This Encounter   Procedures    Ambulatory Referral to External Surgery          Instructed patient to notify my office if they have not been contacted within two weeks after any procedures, submitting any samples (biopsies, blood, stool, urine, etc.) or after any imaging (X-ray, CT, MRI, etc.).      Michelle Sibley NP    This document may have been created using a voice recognition transcribing system. Incorrect words or phrases may have been missed during proofreading. Please interpret accordingly or contact me for clarification.          [1]   Social History  Tobacco Use    Smoking status: Never    Smokeless tobacco: Never   Substance Use Topics    Alcohol use: Yes     Alcohol/week: 2.0 standard drinks of alcohol     Types: 2 Standard drinks or equivalent per week    Drug use: Never

## 2025-02-19 NOTE — PATIENT INSTRUCTIONS
Proceed with upper and lower endoscopies with Dr. Khan on 3/6/2025.   Continue pantoprazole 40 mg daily.     If any tests, procedures, or imaging has been ordered and you are not contacted to schedule within 1-2 weeks, then you may call the central scheduling department directly at [phone].   Please notify my office if you have not been contacted within two weeks after any procedures, submitting any samples (biopsies, blood, stool, urine, etc.) or after any imaging (X-ray, CT, MRI, etc.).

## 2025-03-06 ENCOUNTER — OUTSIDE PLACE OF SERVICE (OUTPATIENT)
Dept: GASTROENTEROLOGY | Facility: CLINIC | Age: 65
End: 2025-03-06

## 2025-03-06 LAB — CRC RECOMMENDATION EXT: NORMAL

## 2025-03-16 ENCOUNTER — RESULTS FOLLOW-UP (OUTPATIENT)
Dept: GASTROENTEROLOGY | Facility: CLINIC | Age: 65
End: 2025-03-16
Payer: COMMERCIAL

## 2025-03-17 NOTE — TELEPHONE ENCOUNTER
DBx nl, GBx wnl, 5 TA, 1 hyp, repeat colonoscopy in 3 years.     Notify patient. No signs of precancerous cells or Celiac disease on the upper endoscopy biopsies.  His colon polyps were benign. Repeat colonoscopy in 3 years.  Confirm recall tab in appointment desk is up-to-date (update if needed). Notify us of any issues.  NBP

## 2025-04-11 ENCOUNTER — DOCUMENTATION ONLY (OUTPATIENT)
Dept: GASTROENTEROLOGY | Facility: CLINIC | Age: 65
End: 2025-04-11
Payer: COMMERCIAL